# Patient Record
Sex: MALE | Race: WHITE | NOT HISPANIC OR LATINO | URBAN - NONMETROPOLITAN AREA
[De-identification: names, ages, dates, MRNs, and addresses within clinical notes are randomized per-mention and may not be internally consistent; named-entity substitution may affect disease eponyms.]

---

## 2019-07-09 ENCOUNTER — IMPORTED ENCOUNTER (OUTPATIENT)
Dept: URBAN - NONMETROPOLITAN AREA CLINIC 1 | Facility: CLINIC | Age: 79
End: 2019-07-09

## 2019-07-09 PROBLEM — G43.801: Noted: 2019-07-09

## 2019-07-09 PROBLEM — H35.362: Noted: 2019-07-09

## 2019-07-09 PROBLEM — H26.492: Noted: 2019-07-17

## 2019-07-09 PROBLEM — H26.493: Noted: 2019-07-09

## 2019-07-09 PROBLEM — Z96.1: Noted: 2019-07-09

## 2019-07-09 PROBLEM — T15.11XA: Noted: 2019-07-09

## 2019-07-09 PROCEDURE — 66821 AFTER CATARACT LASER SURGERY: CPT

## 2019-07-09 PROCEDURE — 99213 OFFICE O/P EST LOW 20 MIN: CPT

## 2019-07-09 PROCEDURE — 92015 DETERMINE REFRACTIVE STATE: CPT

## 2019-07-09 NOTE — PATIENT DISCUSSION
PCIOL OU w/PCO OU-Explained PCO and RBAs of YAG Capsulotomy to pt. -Pt elects to proceed. YAG Caps OD today and YAG Caps OS in 1-2 weeks. 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 Foreign Body Removal-A foreign body is an object(eg Metal glass wood plastic sand) either superficially adherent to or embedded in the cornea of the eye.-There is a risk of infection inflammation bleeding scarring  vision loss and the the need for more surgery.-I have discussed the above procedure possible alternative methods of treatment and risk factors involved with the patient and/or family members.-The patient wishes to proceed with foreign body removal. -START Tobradex qid OD X1 wk

## 2019-07-17 ENCOUNTER — IMPORTED ENCOUNTER (OUTPATIENT)
Dept: URBAN - NONMETROPOLITAN AREA CLINIC 1 | Facility: CLINIC | Age: 79
End: 2019-07-17

## 2019-07-17 PROCEDURE — 66821 AFTER CATARACT LASER SURGERY: CPT

## 2019-07-17 NOTE — PATIENT DISCUSSION
PCIOL OU w/PCO OS-Explained PCO and RBAs of YAG Capsulotomy to pt. -Pt elects to proceed. YAG Caps OS todayDrusen OS -Discussed findings of exam in detail with the patient.-Discussed the chronic nature of this disease and limited treatment options.-Recommended no smoking. RTC 1 YR CEE

## 2020-01-22 ENCOUNTER — IMPORTED ENCOUNTER (OUTPATIENT)
Dept: URBAN - NONMETROPOLITAN AREA CLINIC 1 | Facility: CLINIC | Age: 80
End: 2020-01-22

## 2020-01-22 PROBLEM — H35.362: Noted: 2020-01-22

## 2020-01-22 PROBLEM — G43.801: Noted: 2020-01-22

## 2020-01-22 PROBLEM — Z96.1: Noted: 2020-01-22

## 2020-01-22 PROBLEM — H04.123: Noted: 2020-01-22

## 2020-01-22 PROBLEM — H43.393: Noted: 2020-01-22

## 2020-01-22 PROCEDURE — 92014 COMPRE OPH EXAM EST PT 1/>: CPT

## 2020-01-22 PROCEDURE — 92015 DETERMINE REFRACTIVE STATE: CPT

## 2020-01-22 NOTE — PATIENT DISCUSSION
S/P PCIOL OU- ExcellentS/P YAG Caps OU- Excellent Drusen OS -Discussed findings of exam in detail with the patient.-Discussed the chronic nature of this disease and limited treatment options.-Recommended no smoking. ALDO OU -Explained ALDO and associated symptoms.-Pt to begin artificial tears OU QID PRN. Gave sample in office today -Pt will contact us if this does not provide relief. Consider punctal plugs in that case. Updated spec Rx given. Recommend lens that will provide comfort as well as protect safety and health of eyes. PVD (Floaters OU ) -Retina flat 360 with no breaks tears or heme.-S&S of RD/RT reviewed with pt.-Stressed that pt should contact our office right away with any changes or increase in symptoms. RTC 1 year CEE

## 2020-07-28 ENCOUNTER — IMPORTED ENCOUNTER (OUTPATIENT)
Dept: URBAN - NONMETROPOLITAN AREA CLINIC 1 | Facility: CLINIC | Age: 80
End: 2020-07-28

## 2020-07-28 PROBLEM — H04.123: Noted: 2020-07-28

## 2020-07-28 PROBLEM — Z96.1: Noted: 2020-07-28

## 2020-07-28 PROBLEM — H35.362: Noted: 2020-07-28

## 2020-07-28 PROCEDURE — 92014 COMPRE OPH EXAM EST PT 1/>: CPT

## 2020-07-28 PROCEDURE — 92134 CPTRZ OPH DX IMG PST SGM RTA: CPT

## 2020-07-28 NOTE — PATIENT DISCUSSION
ALDO OU -Explained ALDO and associated symptoms.-Pt to begin artificial tears OU QID PRN. Gave sample in office today -Pt will contact us if this does not provide relief. Consider punctal plugs in that case. Order OCT MAC due to decreased vision. S/P PCIOL OU- ExcellentS/P YAG Caps OU- Excellent Drusen OS -Discussed findings of exam in detail with the patient.-Discussed the chronic nature of this disease and limited treatment options.-Recommended no smoking. RTC 1 year CEE

## 2021-01-21 ENCOUNTER — IMPORTED ENCOUNTER (OUTPATIENT)
Dept: URBAN - NONMETROPOLITAN AREA CLINIC 1 | Facility: CLINIC | Age: 81
End: 2021-01-21

## 2021-01-21 PROBLEM — H35.362: Noted: 2021-01-21

## 2021-01-21 PROBLEM — H04.123: Noted: 2021-01-21

## 2021-01-21 PROBLEM — Z96.1: Noted: 2021-01-21

## 2021-01-21 PROBLEM — H10.533: Noted: 2021-01-21

## 2021-01-21 PROCEDURE — 92012 INTRM OPH EXAM EST PATIENT: CPT

## 2021-01-21 NOTE — PATIENT DISCUSSION
blepharoconjunctivitis discussed wiht pt START Maxitrol qid OU X10 days ALDO OU -Explained ALDO and associated symptoms.-Pt to begin artificial tears OU QID PRN. Gave sample in office today -Pt will contact us if this does not provide relief. Consider punctal plugs in that case. Order OCT MAC due to decreased vision. S/P PCIOL OU- ExcellentS/P YAG Caps OU- Excellent Drusen OS -Discussed findings of exam in detail with the patient.-Discussed the chronic nature of this disease and limited treatment options.-Recommended no smoking. RTC 1 year CEE

## 2022-01-05 ENCOUNTER — PREPPED CHART (OUTPATIENT)
Dept: RURAL CLINIC 2 | Facility: CLINIC | Age: 82
End: 2022-01-05

## 2022-01-05 ENCOUNTER — IMPORTED ENCOUNTER (OUTPATIENT)
Dept: URBAN - NONMETROPOLITAN AREA CLINIC 1 | Facility: CLINIC | Age: 82
End: 2022-01-05

## 2022-01-05 PROBLEM — H43.813: Noted: 2022-01-05

## 2022-01-05 PROBLEM — H35.373: Noted: 2022-01-05

## 2022-01-05 PROBLEM — Z96.1: Noted: 2022-01-05

## 2022-01-05 PROBLEM — H16.223: Noted: 2022-01-05

## 2022-01-05 PROCEDURE — 92015 DETERMINE REFRACTIVE STATE: CPT

## 2022-01-05 PROCEDURE — 92014 COMPRE OPH EXAM EST PT 1/>: CPT

## 2022-01-05 PROCEDURE — 92134 CPTRZ OPH DX IMG PST SGM RTA: CPT

## 2022-01-05 NOTE — PATIENT DISCUSSION
PVD/ERM OU-Patient educated-No impact on vision monitor-MAC OCT ordered and reviewed today repeat annuallyDES OU-AT's PRN OUs/p PCIOL-Stable PCIOL s/p YAG Caps OU.-Monitor. MAP OU-Rx issued for new glasses; if ghosting/diplopia still present afterwards will need to consider prism

## 2022-01-26 ENCOUNTER — EMERGENCY VISIT (OUTPATIENT)
Dept: RURAL CLINIC 2 | Facility: CLINIC | Age: 82
End: 2022-01-26

## 2022-01-26 DIAGNOSIS — Z96.1: ICD-10-CM

## 2022-01-26 PROCEDURE — 99213 OFFICE O/P EST LOW 20 MIN: CPT

## 2022-01-26 ASSESSMENT — VISUAL ACUITY
OD_CC: 20/20
OS_CC: 20/20

## 2022-04-09 ASSESSMENT — VISUAL ACUITY
OS_SC: 20/20
OS_SC: 20/30
OD_SC: 20/25+
OD_SC: 20/20
OS_SC: 20/20
OS_SC: 20/20
OD_SC: 20/25
OS_GLARE: 20/60
OS_SC: 20/40
OD_CC: J1
OS_SC: 20/25
OS_CC: J1
OD_SC: 20/30
OD_SC: 20/25
OD_SC: 20/20

## 2022-04-09 ASSESSMENT — TONOMETRY
OS_IOP_MMHG: 15
OD_IOP_MMHG: 13
OD_IOP_MMHG: 18
OS_IOP_MMHG: 15
OD_IOP_MMHG: 15
OS_IOP_MMHG: 14
OS_IOP_MMHG: 15
OD_IOP_MMHG: 15

## 2023-01-05 ENCOUNTER — ESTABLISHED PATIENT (OUTPATIENT)
Dept: RURAL CLINIC 2 | Facility: CLINIC | Age: 83
End: 2023-01-05

## 2023-01-05 DIAGNOSIS — H16.223: ICD-10-CM

## 2023-01-05 DIAGNOSIS — H52.223: ICD-10-CM

## 2023-01-05 DIAGNOSIS — H43.813: ICD-10-CM

## 2023-01-05 DIAGNOSIS — H52.4: ICD-10-CM

## 2023-01-05 DIAGNOSIS — Z96.1: ICD-10-CM

## 2023-01-05 DIAGNOSIS — H35.373: ICD-10-CM

## 2023-01-05 PROCEDURE — 92014 COMPRE OPH EXAM EST PT 1/>: CPT

## 2023-01-05 PROCEDURE — 92015 DETERMINE REFRACTIVE STATE: CPT

## 2023-01-05 PROCEDURE — 92134 CPTRZ OPH DX IMG PST SGM RTA: CPT

## 2023-01-05 ASSESSMENT — TONOMETRY
OS_IOP_MMHG: 12
OD_IOP_MMHG: 12

## 2023-01-05 ASSESSMENT — VISUAL ACUITY
OD_CC: 20/25
OS_CC: 20/20

## 2024-08-14 ENCOUNTER — COMPREHENSIVE EXAM (OUTPATIENT)
Dept: RURAL CLINIC 2 | Facility: CLINIC | Age: 84
End: 2024-08-14

## 2024-08-14 DIAGNOSIS — H16.223: ICD-10-CM

## 2024-08-14 DIAGNOSIS — H52.4: ICD-10-CM

## 2024-08-14 DIAGNOSIS — Z96.1: ICD-10-CM

## 2024-08-14 DIAGNOSIS — H35.373: ICD-10-CM

## 2024-08-14 DIAGNOSIS — H43.813: ICD-10-CM

## 2024-08-14 PROCEDURE — 92015 DETERMINE REFRACTIVE STATE: CPT

## 2024-08-14 PROCEDURE — 92134 CPTRZ OPH DX IMG PST SGM RTA: CPT

## 2024-08-14 PROCEDURE — 92014 COMPRE OPH EXAM EST PT 1/>: CPT

## 2024-08-14 ASSESSMENT — TONOMETRY
OS_IOP_MMHG: 12
OD_IOP_MMHG: 12

## 2024-08-14 ASSESSMENT — VISUAL ACUITY
OU_SC: 20/30-1
OS_SC: 20/40-1
OD_SC: 20/40